# Patient Record
Sex: MALE
[De-identification: names, ages, dates, MRNs, and addresses within clinical notes are randomized per-mention and may not be internally consistent; named-entity substitution may affect disease eponyms.]

---

## 2019-04-23 ENCOUNTER — HOSPITAL ENCOUNTER (OUTPATIENT)
Dept: HOSPITAL 88 - OR | Age: 49
Discharge: HOME | End: 2019-04-23
Attending: SPECIALIST
Payer: COMMERCIAL

## 2019-04-23 VITALS — SYSTOLIC BLOOD PRESSURE: 138 MMHG | DIASTOLIC BLOOD PRESSURE: 96 MMHG
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
## 2019-04-23 DIAGNOSIS — Z01.810: ICD-10-CM
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
## 2019-04-23 DIAGNOSIS — E66.01: ICD-10-CM

## 2019-04-23 DIAGNOSIS — G47.33: ICD-10-CM

## 2019-04-23 DIAGNOSIS — S82.841A: Primary | ICD-10-CM

## 2019-04-23 DIAGNOSIS — I10: ICD-10-CM

## 2019-04-23 DIAGNOSIS — R00.0: ICD-10-CM

## 2019-04-23 PROCEDURE — 93005 ELECTROCARDIOGRAM TRACING: CPT

## 2019-04-23 PROCEDURE — 27814 TREATMENT OF ANKLE FRACTURE: CPT

## 2019-04-23 PROCEDURE — 76000 FLUOROSCOPY <1 HR PHYS/QHP: CPT

## 2019-04-23 NOTE — OPERATIVE REPORT
DATE OF PROCEDURE:  04/23/2019

 

SURGEON:  Chi Pena MD

 

ASSISTANT:  Leo Thorne, certified PA.

 

PREOPERATIVE DIAGNOSIS:  Right ankle bimalleolar fracture.

 

POSTOPERATIVE DIAGNOSIS:  Right ankle bimalleolar fracture.

 

PROCEDURE:  Open reduction and internal fixation of right ankle.

 

INDICATIONS:  The patient is a 48-year-old gentleman, who has a displaced bimalleolar

fracture of his right ankle.  The findings and options have been discussed.  We

recommend surgical stabilization.  The risks and benefits have been explained.  The

added challenges due to his weight of nearly 300 pounds has been explained.  He states

he understands and wishes to proceed. 

 

PROCEDURE IN DETAIL:  The patient was brought to the operating room and placed under

general anesthetic.  His right lower extremity was prepped and draped in a sterile

manner.  A preoperative time-out was performed.  The extremity was exsanguinated and a

proximal tourniquet was inflated to 350 mmHg.  Initial attention was directed toward the

distal fibula.  An incision was made.  The fracture site was carefully exposed.  A

lobster claw reduction clamp was used to obtain an anatomic reduction.  An anterior to

posterior lag screw was placed.  Because of his size, I elected to place a 3.5 mm plate

on the lateral distal fibula.  This was fixed proximally and distally.  The plate was

positioned in a way to buttress the posteriorly displaced distal fibula.  Attention was

then directed toward the medial malleolus.  An incision was made over the inside of the

ankle.  The fracture site was carefully identified.  There was some involuted periosteum

and deltoid ligament.  This was removed.  The fracture site was gently debrided.  This

was anatomically reduced and fixed with two partially threaded 40 mm in length

cancellous screws.  Excellent fixation was felt to be obtained.  Intraoperative x-rays

confirmed an anatomic reduction of the fracture.  Both of the wounds were irrigated and

closed with subcuticular Vicryl and staples.  A sterile bandage in a U splint and foot

plate were applied.  Estimated blood loss is about 10 mL.  All needle and sponge counts

were correct. 

 

 

 

 

______________________________

Chi Pena MD DR/BRIGIDO

D:  04/23/2019 12:00:18

T:  04/23/2019 16:01:53

Job #:  [id number]

## 2019-04-23 NOTE — NUR
SPIRITUAL CARE - Pre-Surgery



Assessment:  Pt in bed. Pt's family at bedside. Pt reported supportive attention from family 
and friends. 

Intervention: I provided pastoral presence, hospitality, and sympathetic listening.  I 
acquainted pt with availability of  while hospitalized.

Outcome: Pt expressed appreciation for visit. No need for follow up indicated at this time. 



TERENCE Rowanlain

Spiritual Care Department

O: [phone]

Pager: [phone] ([phone])

## 2019-04-25 NOTE — OPERATIVE REPORT
DATE OF PROCEDURE:  

 

SURGEON:  Chi Pena MD

 

ASSISTANT:  Leo Thorne, certified PA.

 

PREOPERATIVE DIAGNOSIS:  Displaced bimalleolar ankle fracture, right lower leg.

 

POSTOPERATIVE DIAGNOSIS:  Displaced bimalleolar ankle fracture, right lower leg.

 

PROCEDURE:  Open reduction and internal fixation, right ankle.

 

INDICATIONS:  The patient is a 48-year-old gentleman, who has a displaced right ankle

fracture.  The findings and options have been discussed.  We plan on open reduction with

internal fixation.  The risks and benefits have been explained.  He states he

understands and wishes to proceed. 

 

DESCRIPTION OF THE PROCEDURE:  The patient was brought to the operating room and placed

under general anesthetic.  His right lower extremity was prepped and draped in a sterile

manner.  A preoperative time-out was performed.  The extremity was exsanguinated and a

proximal tourniquet was inflated to 350 mmHg.  He had received prophylactic antibiotics

in the holding area.  Initial attention was directed towards the distal fibula.  An

incision was made and the fracture site was carefully exposed.  Care was taken to avoid

excessive periosteal stripping.  The fracture was fixed with a combination of cortical

and cancellous screws.  Because of his size, I elected to use a 3.5 mm plate.  Nice

secure fixation was felt to be obtained.  Attention was directed towards the medial

aspect.  This fracture was also carefully exposed and reduced.  This was fixed with

partially threaded cancellous screws.  Intraoperative x-rays were taken.  He had

anatomic reduction and good positioning of the hardware.  The wounds were irrigated and

closed.  A sterile bandage and a splint were applied.  He was extubated and transported

to the recovery room in stable condition.  Blood loss was less than 10 mL and all needle

and sponge counts were correct. 

 

 

 

 

______________________________

Chi Pena MD DR/BRIGIDO

D:  04/25/2019 15:18:13

T:  04/25/2019 21:45:28

Job #:  [id number]

## 2022-02-20 ENCOUNTER — HOSPITAL ENCOUNTER (EMERGENCY)
Dept: HOSPITAL 88 - ER | Age: 52
Discharge: HOME | End: 2022-02-20
Payer: COMMERCIAL

## 2022-02-20 VITALS — HEIGHT: 65 IN | WEIGHT: 240 LBS | BODY MASS INDEX: 39.99 KG/M2

## 2022-02-20 DIAGNOSIS — R35.0: Primary | ICD-10-CM

## 2022-02-20 DIAGNOSIS — I10: ICD-10-CM

## 2022-02-20 LAB
BACTERIA URNS QL MICRO: (no result) /HPF
CLARITY UR: CLEAR
COLOR UR: YELLOW
DEPRECATED RBC URNS MANUAL-ACNC: (no result) /HPF (ref 0–5)
EPI CELLS URNS QL MICRO: (no result) /LPF
KETONES UR QL STRIP.AUTO: NEGATIVE
LEUKOCYTE ESTERASE UR QL STRIP.AUTO: NEGATIVE
NITRITE UR QL STRIP.AUTO: NEGATIVE
PROT UR QL STRIP.AUTO: NEGATIVE
SP GR UR STRIP: 1.01 (ref 1.01–1.02)
UROBILINOGEN UR STRIP-MCNC: 0.2 MG/DL (ref 0.2–1)
WBC #/AREA URNS HPF: (no result) /HPF (ref 0–5)

## 2022-02-20 PROCEDURE — 99283 EMERGENCY DEPT VISIT LOW MDM: CPT

## 2022-02-20 PROCEDURE — 81001 URINALYSIS AUTO W/SCOPE: CPT

## 2022-02-20 PROCEDURE — 36415 COLL VENOUS BLD VENIPUNCTURE: CPT

## 2022-02-20 PROCEDURE — 82948 REAGENT STRIP/BLOOD GLUCOSE: CPT

## 2022-02-20 PROCEDURE — 87086 URINE CULTURE/COLONY COUNT: CPT

## 2022-12-29 ENCOUNTER — HOSPITAL ENCOUNTER (OUTPATIENT)
Dept: HOSPITAL 88 - RAD | Age: 52
End: 2022-12-29
Attending: INTERNAL MEDICINE
Payer: COMMERCIAL

## 2022-12-29 DIAGNOSIS — I10: ICD-10-CM

## 2022-12-29 DIAGNOSIS — Z12.11: Primary | ICD-10-CM

## 2022-12-29 DIAGNOSIS — Z53.8: ICD-10-CM

## 2022-12-29 PROCEDURE — 93005 ELECTROCARDIOGRAM TRACING: CPT

## 2023-05-24 ENCOUNTER — HOSPITAL ENCOUNTER (EMERGENCY)
Dept: HOSPITAL 88 - ER | Age: 53
Discharge: HOME | End: 2023-05-24
Payer: COMMERCIAL

## 2023-05-24 VITALS — WEIGHT: 240 LBS | BODY MASS INDEX: 39.99 KG/M2 | HEIGHT: 65 IN

## 2023-05-24 VITALS — OXYGEN SATURATION: 100 %

## 2023-05-24 DIAGNOSIS — S83.8X2A: Primary | ICD-10-CM
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
## 2023-05-24 DIAGNOSIS — I10: ICD-10-CM
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
## 2023-05-24 PROCEDURE — 73562 X-RAY EXAM OF KNEE 3: CPT

## 2023-05-24 PROCEDURE — 99283 EMERGENCY DEPT VISIT LOW MDM: CPT
